# Patient Record
Sex: MALE | Race: WHITE | NOT HISPANIC OR LATINO | Employment: FULL TIME | URBAN - NONMETROPOLITAN AREA
[De-identification: names, ages, dates, MRNs, and addresses within clinical notes are randomized per-mention and may not be internally consistent; named-entity substitution may affect disease eponyms.]

---

## 2017-01-25 ENCOUNTER — APPOINTMENT (OUTPATIENT)
Dept: OCCUPATIONAL MEDICINE | Facility: OTHER | Age: 32
End: 2017-01-25

## 2017-01-30 ENCOUNTER — APPOINTMENT (OUTPATIENT)
Dept: OCCUPATIONAL MEDICINE | Facility: OTHER | Age: 32
End: 2017-01-30

## 2021-08-11 ENCOUNTER — OFFICE VISIT (OUTPATIENT)
Dept: FAMILY MEDICINE | Facility: OTHER | Age: 36
End: 2021-08-11
Attending: NURSE PRACTITIONER
Payer: COMMERCIAL

## 2021-08-11 VITALS
HEART RATE: 74 BPM | SYSTOLIC BLOOD PRESSURE: 134 MMHG | HEIGHT: 71 IN | TEMPERATURE: 97.7 F | BODY MASS INDEX: 34.06 KG/M2 | WEIGHT: 243.25 LBS | DIASTOLIC BLOOD PRESSURE: 76 MMHG | OXYGEN SATURATION: 97 % | RESPIRATION RATE: 20 BRPM

## 2021-08-11 DIAGNOSIS — L53.9 ERYTHEMA OF FINGER: Primary | ICD-10-CM

## 2021-08-11 PROCEDURE — 99202 OFFICE O/P NEW SF 15 MIN: CPT | Performed by: NURSE PRACTITIONER

## 2021-08-11 RX ORDER — OMEPRAZOLE 40 MG/1
CAPSULE, DELAYED RELEASE ORAL
COMMUNITY
Start: 2021-08-09

## 2021-08-11 RX ORDER — FLUTICASONE PROPIONATE 50 MCG
SPRAY, SUSPENSION (ML) NASAL
COMMUNITY
Start: 2020-12-28

## 2021-08-11 RX ORDER — FAMOTIDINE 40 MG/1
TABLET, FILM COATED ORAL
COMMUNITY
Start: 2021-08-09

## 2021-08-11 RX ORDER — METHIMAZOLE 10 MG/1
10 TABLET ORAL
COMMUNITY
Start: 2020-11-18

## 2021-08-11 RX ORDER — MONTELUKAST SODIUM 10 MG/1
10 TABLET ORAL
COMMUNITY
Start: 2021-06-13

## 2021-08-11 RX ORDER — METOPROLOL SUCCINATE 25 MG/1
25 TABLET, EXTENDED RELEASE ORAL
COMMUNITY
Start: 2021-03-29

## 2021-08-11 RX ORDER — SILDENAFIL CITRATE 20 MG/1
20 TABLET ORAL
COMMUNITY
Start: 2021-07-19

## 2021-08-11 ASSESSMENT — PAIN SCALES - GENERAL: PAINLEVEL: MODERATE PAIN (4)

## 2021-08-11 ASSESSMENT — MIFFLIN-ST. JEOR: SCORE: 2055.5

## 2021-08-11 NOTE — PATIENT INSTRUCTIONS
Please start applying neosporin twice daily and cover with a bandage.     Soak the hand in warm water and epsom salt 2-3 times daily 10-15 mintues.

## 2021-08-11 NOTE — NURSING NOTE
Patient presents to clinic experiencing pain, redness, swelling to tip of ring finger on left hand for past week.  He states he cut a hang-nail last week and now is having pain to area.  Medication Reconciliation: complete    Gabby Dey LPN

## 2021-08-11 NOTE — PROGRESS NOTES
ASSESSMENT/PLAN:  1. Erythema of finger      Discussed with the patient that based on his symptoms and physical exam findings this localized area of swelling and erythema should be treated with a topical OTC antibiotic ointment. An oral antibiotic is not indicated at this time. Discussed with the patient to also Soak the hand in warm water and epsom salt 2-3 times daily 10-15 mintues.     Instructed the patient to follow up with his PCP if his symptoms persist or worsen.     May use over-the-counter Tylenol or ibuprofen PRN    Discussed warning signs/symptoms indicative of need to f/u    Follow up if symptoms persist or worsen or concerns      I explained my diagnostic considerations and recommendations to the patient, who voiced understanding and agreement with the treatment plan. All questions were answered. We discussed potential side effects of any prescribed or recommended therapies, as well as expectations for response to treatments.        HPI:    Ge Concepcion is a 36 year old male  who presents to [facility] today for left 4th finger erythema surrounding the lateral nail. The patient states that he cut a hang nail last week from the same location and today started noticing erythema started a couple of days ago and swelling started today surrounding the skin. Rating his pain 4-5/10. Very painful with palpation. Denies seeing any drainage from the affected area. Denies fevers. Denies any injury to the finger.     Past Medical History:   Diagnosis Date     Thyrotoxicosis     No Comments Provided     Past Surgical History:   Procedure Laterality Date     TONSILLECTOMY      No Comments Provided     Social History     Tobacco Use     Smoking status: Former Smoker     Years: 12.00     Types: Cigarettes     Smokeless tobacco: Never Used   Substance Use Topics     Alcohol use: Yes     Current Outpatient Medications   Medication Sig Dispense Refill     famotidine (PEPCID) 40 MG tablet        fluticasone (FLONASE)  "50 MCG/ACT nasal spray INSTILL 1 SPRAY NASALLY TWICE DAILY. SHAKE GENTLY BEFORE EACH USE; ALTERNATE NOSTRILS WITH EACH SPRAY       methimazole (TAPAZOLE) 10 MG tablet Take 10 mg by mouth       metoprolol succinate ER (TOPROL-XL) 25 MG 24 hr tablet Take 25 mg by mouth       montelukast (SINGULAIR) 10 MG tablet Take 10 mg by mouth       omeprazole (PRILOSEC) 40 MG DR capsule        sildenafil (REVATIO) 20 MG tablet Take 20 mg by mouth       Allergies   Allergen Reactions     Erythromycin Nausea and Vomiting     Other reaction(s): Vomiting     Other Environmental Allergy      Other reaction(s): Chest Tightness, Eye Irritation, Headache, Rhinitis  Dogs, cats, grasses, pollens     Penicillins Other (See Comments) and Nausea and Vomiting         Past medical history, past surgical history, current medications and allergies reviewed and accurate to the best of my knowledge.        ROS:  Refer to HPI    /76 (BP Location: Left arm, Patient Position: Sitting, Cuff Size: Adult Large)   Pulse 74   Temp 97.7  F (36.5  C) (Tympanic)   Resp 20   Ht 1.803 m (5' 11\")   Wt 110.3 kg (243 lb 4 oz)   SpO2 97%   BMI 33.93 kg/m      EXAM:  General Appearance: Well appearing male, appropriate appearance for age. No acute distress  Respiratory: normal chest wall and respirations.  Normal effort.  Clear to auscultation bilaterally, no wheezing, crackles or rhonchi.  No increased work of breathing.  No cough appreciated.  Cardiac: RRR with no murmurs  Musculoskeletal:  Equal movement of bilateral upper extremities.  Equal movement of bilateral lower extremities.  Normal gait.    Dermatological: Mild erythema and swelling to the lateral aspect of the left 4th finger nail.  Psychological: normal affect, alert, oriented, and pleasant.               "

## 2021-12-21 ENCOUNTER — HISTORICAL (OUTPATIENT)
Dept: ADMINISTRATIVE | Facility: HOSPITAL | Age: 36
End: 2021-12-21

## 2021-12-23 LAB — FINAL CULTURE: NO GROWTH

## 2023-03-20 ENCOUNTER — OFFICE VISIT (OUTPATIENT)
Dept: FAMILY MEDICINE | Facility: OTHER | Age: 38
End: 2023-03-20
Attending: NURSE PRACTITIONER
Payer: COMMERCIAL

## 2023-03-20 ENCOUNTER — HOSPITAL ENCOUNTER (OUTPATIENT)
Dept: GENERAL RADIOLOGY | Facility: OTHER | Age: 38
Discharge: HOME | End: 2023-03-20
Attending: NURSE PRACTITIONER
Payer: COMMERCIAL

## 2023-03-20 VITALS
HEART RATE: 87 BPM | TEMPERATURE: 98.3 F | OXYGEN SATURATION: 98 % | DIASTOLIC BLOOD PRESSURE: 86 MMHG | RESPIRATION RATE: 18 BRPM | BODY MASS INDEX: 40.43 KG/M2 | SYSTOLIC BLOOD PRESSURE: 130 MMHG | WEIGHT: 289.9 LBS

## 2023-03-20 DIAGNOSIS — M47.817 FACET HYPERTROPHY OF LUMBOSACRAL REGION: Primary | ICD-10-CM

## 2023-03-20 DIAGNOSIS — M54.50 ACUTE MIDLINE LOW BACK PAIN WITHOUT SCIATICA: ICD-10-CM

## 2023-03-20 PROCEDURE — 72100 X-RAY EXAM L-S SPINE 2/3 VWS: CPT

## 2023-03-20 PROCEDURE — 96372 THER/PROPH/DIAG INJ SC/IM: CPT | Performed by: NURSE PRACTITIONER

## 2023-03-20 PROCEDURE — 99203 OFFICE O/P NEW LOW 30 MIN: CPT | Performed by: NURSE PRACTITIONER

## 2023-03-20 PROCEDURE — 250N000011 HC RX IP 250 OP 636: Performed by: NURSE PRACTITIONER

## 2023-03-20 RX ORDER — METHOCARBAMOL 750 MG/1
750 TABLET, FILM COATED ORAL 3 TIMES DAILY PRN
Qty: 30 TABLET | Refills: 1 | Status: SHIPPED | OUTPATIENT
Start: 2023-03-20

## 2023-03-20 RX ORDER — KETOROLAC TROMETHAMINE 30 MG/ML
30 INJECTION, SOLUTION INTRAMUSCULAR; INTRAVENOUS ONCE
Status: COMPLETED | OUTPATIENT
Start: 2023-03-20 | End: 2023-03-20

## 2023-03-20 RX ORDER — NAPROXEN 500 MG/1
500 TABLET ORAL 2 TIMES DAILY WITH MEALS
Qty: 14 TABLET | Refills: 0 | Status: SHIPPED | OUTPATIENT
Start: 2023-03-20

## 2023-03-20 RX ADMIN — KETOROLAC TROMETHAMINE 30 MG: 30 INJECTION, SOLUTION INTRAMUSCULAR; INTRAVENOUS at 12:53

## 2023-03-20 ASSESSMENT — PAIN SCALES - GENERAL: PAINLEVEL: SEVERE PAIN (7)

## 2023-03-20 NOTE — PROGRESS NOTES
ASSESSMENT/PLAN:     I have reviewed the nursing notes.  I have reviewed the findings, diagnosis, plan and need for follow up with the patient.      1. Acute midline low back pain without sciatica    - ketorolac (TORADOL) injection 30 mg IM injection administered in clinic with minimal decrease in pain  - XR Lumbar Spine 2/3 Views  - naproxen (NAPROSYN) 500 MG tablet; Take 1 tablet (500 mg) by mouth 2 times daily (with meals)  Dispense: 14 tablet; Refill: 0  - methocarbamol (ROBAXIN) 750 MG tablet; Take 1 tablet (750 mg) by mouth 3 times daily as needed for muscle spasms  Dispense: 30 tablet; Refill: 1    PDMP reviewed, no concerns    Xray of lumbar spine completed and personally reviewed, no obvious abnormality noted, radiologist over read:  No acute fracture. Preserved lumbar heights and alignment. Mild facet hypertrophy best seen at L5-S1.        2. Facet hypertrophy of lumbosacral region    - naproxen (NAPROSYN) 500 MG tablet; Take 1 tablet (500 mg) by mouth 2 times daily (with meals)  Dispense: 14 tablet; Refill: 0  - methocarbamol (ROBAXIN) 750 MG tablet; Take 1 tablet (750 mg) by mouth 3 times daily as needed for muscle spasms  Dispense: 30 tablet; Refill: 1    Discussed xray results with patient.    Encouraged low impact activities such as walking and swimming on regular basis.  Staying active is important.  May use back brace occasionally for postural support.  Encouraged correct posture.  Encouraged exercise to strengthen abdominal and back muscles  Encouraged alternating ice and heat TID PRN  Encouraged OTC pain cream or patches PRN  Start Aleve tomorrow as Toradol injection was administered today in clinic  Start muscle relaxer today for right paraspinal muscle tenderness    Discussed warning signs/symptoms indicative of need to f/u including bowel or bladder dysfunction or incontinence, lower extremity weakness or severe numbness/tingling, difficulty ambulating, or any concerns, etc   Follow up if  symptoms persist or worsen or concerns      I explained my diagnostic considerations and recommendations to the patient, who voiced understanding and agreement with the treatment plan. All questions were answered. We discussed potential side effects of any prescribed or recommended therapies, as well as expectations for response to treatments.    Naheed Kee NP  [facility]      SUBJECTIVE:   Ge Concepcion is a 37 year old male who presents to clinic today for the following health issues:  Lower back pain    HPI  States midline lower back tightness for the past 4 days and now painful since yesterday.  No known injury or trauma.   Yesterday only able to lay on side to relieve the pain.  Increased pain with lifting, walking and bending.  No numbness, tingling or weakness in lower extremities.   No fevers or chills.   No difficulty with bowel or bladder functions.  No dysuria or hematuria.  Pushing fluids.  Taking Tylenol and Ibuprofen - last Tylenol about 2 hours ago and Ibuprofen last about 4 hours ago with minimal decrease in pain.  No ice.  Occasional heat.      Past Medical History:   Diagnosis Date     Thyrotoxicosis     No Comments Provided     Past Surgical History:   Procedure Laterality Date     TONSILLECTOMY      No Comments Provided     Social History     Tobacco Use     Smoking status: Former     Years: 12.00     Types: Cigarettes     Smokeless tobacco: Never   Substance Use Topics     Alcohol use: Yes     Current Outpatient Medications   Medication Sig Dispense Refill     famotidine (PEPCID) 40 MG tablet        fluticasone (FLONASE) 50 MCG/ACT nasal spray INSTILL 1 SPRAY NASALLY TWICE DAILY. SHAKE GENTLY BEFORE EACH USE; ALTERNATE NOSTRILS WITH EACH SPRAY       methimazole (TAPAZOLE) 10 MG tablet Take 10 mg by mouth       metoprolol succinate ER (TOPROL-XL) 25 MG 24 hr tablet Take 25 mg by mouth       montelukast (SINGULAIR) 10 MG tablet Take 10 mg by mouth       omeprazole  (PRILOSEC) 40 MG DR capsule        sildenafil (REVATIO) 20 MG tablet Take 20 mg by mouth       Allergies   Allergen Reactions     Erythromycin Nausea and Vomiting     Other reaction(s): Vomiting     Other Environmental Allergy      Other reaction(s): Chest Tightness, Eye Irritation, Headache, Rhinitis  Dogs, cats, grasses, pollens     Penicillins Other (See Comments) and Nausea and Vomiting         Past medical history, past surgical history, current medications and allergies reviewed and accurate to the best of my knowledge.        OBJECTIVE:     /86 (BP Location: Right arm, Patient Position: Sitting, Cuff Size: Adult Large)   Pulse 87   Temp 98.3  F (36.8  C) (Tympanic)   Resp 18   Wt 131.5 kg (289 lb 14.4 oz)   SpO2 98%   BMI 40.43 kg/m    Body mass index is 40.43 kg/m .     Physical Exam  General Appearance: Well appearing adult male, appropriate appearance for age. No acute distress  Respiratory: normal chest wall and respirations.  Normal effort.  No cough appreciated.  Cardiac: RRR with no murmurs  Musculoskeletal:  Equal movement of bilateral upper extremities.  Equal movement of bilateral lower extremities.  Lumbar spine with tenderness to palpation.  Right lumbar paraspinal tenderness.  Non tender left lumbar paraspinal.  Changes position with guarding and from sitting to standing with using hands to push off.  Normal and equal bilateral straight leg raises without difficulty and denies discomfort.  Equal bilateral lateral hip motion with discomfort noted.  Able to bend at waist to fingers to mid shins.  Normal gait.    Dermatological:   Psychological: normal affect, alert, oriented, and pleasant.       Imaging:  Results for orders placed or performed in visit on 03/20/23   XR Lumbar Spine 2/3 Views     Status: None    Narrative    XR LUMBAR SPINE 2/3 VIEWS    HISTORY: Acute midline low back pain without sciatica .    COMPARISON: None.    TECHNIQUE: 3 views of the lumbosacral  spine.    FINDINGS:    No acute fracture. Preserved lumbar heights and alignment. Mild facet  hypertrophy best seen at L5-S1.        Impression    IMPRESSION:     Mild L5-S1 facet hypertrophy.      ESTELLE PIERCE MD         SYSTEM ID:  [id number]

## 2023-03-20 NOTE — NURSING NOTE
Pt states that back has been tight for the last 4-5 days.  Really started hurting yesterday.  Low back

## 2023-05-04 ENCOUNTER — HOSPITAL ENCOUNTER (EMERGENCY)
Facility: OTHER | Age: 38
Discharge: HOME | End: 2023-05-04
Attending: PHYSICIAN ASSISTANT | Admitting: PHYSICIAN ASSISTANT
Payer: COMMERCIAL

## 2023-05-04 VITALS
WEIGHT: 289 LBS | BODY MASS INDEX: 40.46 KG/M2 | SYSTOLIC BLOOD PRESSURE: 158 MMHG | HEART RATE: 86 BPM | OXYGEN SATURATION: 96 % | HEIGHT: 71 IN | RESPIRATION RATE: 20 BRPM | DIASTOLIC BLOOD PRESSURE: 100 MMHG | TEMPERATURE: 98.8 F

## 2023-05-04 DIAGNOSIS — M54.50 LOW BACK PAIN, UNSPECIFIED BACK PAIN LATERALITY, UNSPECIFIED CHRONICITY, UNSPECIFIED WHETHER SCIATICA PRESENT: ICD-10-CM

## 2023-05-04 PROCEDURE — 99285 EMERGENCY DEPT VISIT HI MDM: CPT | Performed by: PHYSICIAN ASSISTANT

## 2023-05-04 PROCEDURE — 99283 EMERGENCY DEPT VISIT LOW MDM: CPT | Performed by: PHYSICIAN ASSISTANT

## 2023-05-04 PROCEDURE — 250N000011 HC RX IP 250 OP 636: Performed by: PHYSICIAN ASSISTANT

## 2023-05-04 PROCEDURE — 250N000012 HC RX MED GY IP 250 OP 636 PS 637: Performed by: PHYSICIAN ASSISTANT

## 2023-05-04 PROCEDURE — 250N000013 HC RX MED GY IP 250 OP 250 PS 637: Performed by: PHYSICIAN ASSISTANT

## 2023-05-04 PROCEDURE — 96372 THER/PROPH/DIAG INJ SC/IM: CPT | Performed by: PHYSICIAN ASSISTANT

## 2023-05-04 RX ORDER — DIAZEPAM 10 MG/2ML
5 INJECTION, SOLUTION INTRAMUSCULAR; INTRAVENOUS ONCE
Status: DISCONTINUED | OUTPATIENT
Start: 2023-05-04 | End: 2023-05-04

## 2023-05-04 RX ORDER — LORAZEPAM 2 MG/ML
1 INJECTION INTRAMUSCULAR ONCE
Status: DISCONTINUED | OUTPATIENT
Start: 2023-05-04 | End: 2023-05-04

## 2023-05-04 RX ORDER — METHOCARBAMOL 500 MG/1
1000 TABLET, FILM COATED ORAL 4 TIMES DAILY
Status: DISCONTINUED | OUTPATIENT
Start: 2023-05-04 | End: 2023-05-04 | Stop reason: HOSPADM

## 2023-05-04 RX ORDER — KETOROLAC TROMETHAMINE 15 MG/ML
15 INJECTION, SOLUTION INTRAMUSCULAR; INTRAVENOUS ONCE
Status: COMPLETED | OUTPATIENT
Start: 2023-05-04 | End: 2023-05-04

## 2023-05-04 RX ORDER — LORAZEPAM 2 MG/ML
1 INJECTION INTRAMUSCULAR ONCE
Status: COMPLETED | OUTPATIENT
Start: 2023-05-04 | End: 2023-05-04

## 2023-05-04 RX ORDER — OXYCODONE AND ACETAMINOPHEN 5; 325 MG/1; MG/1
2 TABLET ORAL ONCE
Status: COMPLETED | OUTPATIENT
Start: 2023-05-04 | End: 2023-05-04

## 2023-05-04 RX ORDER — PREDNISONE 20 MG/1
40 TABLET ORAL ONCE
Status: COMPLETED | OUTPATIENT
Start: 2023-05-04 | End: 2023-05-04

## 2023-05-04 RX ORDER — METHOCARBAMOL 1000 MG/1
1000 TABLET, FILM COATED ORAL 3 TIMES DAILY
Qty: 15 TABLET | Refills: 0 | Status: SHIPPED | OUTPATIENT
Start: 2023-05-04 | End: 2023-05-09

## 2023-05-04 RX ORDER — TRAMADOL HYDROCHLORIDE 50 MG/1
50 TABLET ORAL EVERY 6 HOURS PRN
Qty: 6 TABLET | Refills: 0 | Status: SHIPPED | OUTPATIENT
Start: 2023-05-04

## 2023-05-04 RX ORDER — PREDNISONE 20 MG/1
TABLET ORAL
Qty: 10 TABLET | Refills: 0 | Status: SHIPPED | OUTPATIENT
Start: 2023-05-04

## 2023-05-04 RX ORDER — NAPROXEN SODIUM 550 MG
550 TABLET ORAL 2 TIMES DAILY WITH MEALS
Qty: 10 TABLET | Refills: 0 | Status: SHIPPED | OUTPATIENT
Start: 2023-05-04 | End: 2023-05-09

## 2023-05-04 RX ADMIN — OXYCODONE HYDROCHLORIDE AND ACETAMINOPHEN 2 TABLET: 5; 325 TABLET ORAL at 17:12

## 2023-05-04 RX ADMIN — PREDNISONE 40 MG: 20 TABLET ORAL at 18:26

## 2023-05-04 RX ADMIN — METHOCARBAMOL 1000 MG: 500 TABLET ORAL at 16:54

## 2023-05-04 RX ADMIN — LORAZEPAM 1 MG: 2 INJECTION INTRAMUSCULAR; INTRAVENOUS at 18:32

## 2023-05-04 ASSESSMENT — ACTIVITIES OF DAILY LIVING (ADL)
ADLS_ACUITY_SCORE: 35
ADLS_ACUITY_SCORE: 35

## 2023-05-04 NOTE — ED NOTES
Patient continues to report significant pain when sitting up and standing, but is doing better with movement.  He appears less guarded.  Ice packs, he reports, are very helpful.

## 2023-05-04 NOTE — ED TRIAGE NOTES
Patient was in his truck when he went to get out of his truck at around 3pm and he had a sudden onset of back pain.  No trauma or injury prior to this.  He was at [facility] about a month ago for back pain and was told he has arthritis in his back.  He arrives via [facility].  Unable to sit in w/c for triage, brought to a room due to severe pain.  Denies any numbness or tingling in extremities or loss of bowel/bladder.  Pain is to mid-low back, describes it as warm, aching, and shooting. Was unable to lift feet to put in w/c foot pedals without causing worsening pain.  Difficulty transferring from w/c to bed.  Ice provided to patient for pain.

## 2023-05-04 NOTE — ED NOTES
Patient resting in bed.  Significant other at bedside.  Pain medication given.  Holding IM toradol, as patient had this before and it was painful.  If pain does not decrease with other medications, he will take it however.

## 2023-05-04 NOTE — ED PROVIDER NOTES
Chief Complaint:  Back Pain       HPI   Ge Concepcion is a 38 year old male who presents with significant other for lower back pain when he got a truck around 3 PM he had sudden onset of back pain at 40 mg of about a month ago is seen in the [facility] care for similar incident where he hurt his back and was told he has arthritis in his back and comes in by ambulance for further evaluation no urinary retention no loss bowel bladder function did not fall and hit his head he does not have any headache neck pain chest pain shortness of breath no abdominal pain rashes he does not have any trauma he presents today for further evaluation of the above symptoms.  His symptoms are moderate to severe worse with movement.  He says the only thing at this time that helps with his ice.    Independent Historian: Patient provides the history    Review of External Notes: reviewed    ROS:  Please see HPI for pertinent positives and negatives.  All other systems reviewed and found to be negative.     Allergies:  Erythromycin  Other Environmental Allergy  Penicillins     Medications:    ANAPROX  MG tablet  famotidine (PEPCID) 40 MG tablet  fluticasone (FLONASE) 50 MCG/ACT nasal spray  methimazole (TAPAZOLE) 10 MG tablet  methocarbamol (ROBAXIN) 750 MG tablet  methocarbamol 1000 MG TABS  metoprolol succinate ER (TOPROL-XL) 25 MG 24 hr tablet  montelukast (SINGULAIR) 10 MG tablet  naproxen (NAPROSYN) 500 MG tablet  omeprazole (PRILOSEC) 40 MG DR capsule  predniSONE (DELTASONE) 20 MG tablet  traMADol (ULTRAM) 50 MG tablet  sildenafil (REVATIO) 20 MG tablet        Past Medical History:    Past Medical History:   Diagnosis Date     Thyrotoxicosis        Past Surgical History:    Past Surgical History:   Procedure Laterality Date     TONSILLECTOMY      No Comments Provided        Family History:    family history is not on file.    Social History:   reports that he has quit smoking. His smoking use included cigarettes. He has never used  "smokeless tobacco. He reports current alcohol use.  PCP: No Ref-Primary, Physician     Physical Exam     Patient Vitals for the past 24 hrs:   BP Temp Temp src Pulse Resp SpO2 Height Weight   05/04/23 1623 (!) 157/81 98.8  F (37.1  C) Tympanic 73 18 98 % 1.803 m (5' 11\") 131.1 kg (289 lb)        Vitals:    05/04/23 1623   BP: (!) 157/81   Pulse: 73   Resp: 18   Temp: 98.8  F (37.1  C)   TempSrc: Tympanic   SpO2: 98%   Weight: 131.1 kg (289 lb)   Height: 1.803 m (5' 11\")       Physical Exam  Exam:  Constitutional: healthy, alert and no distress  Head: Normocephalic.    Neck: Neck supple.    ENT: ENT exam normal, no neck nodes or sinus tenderness  Cardiovascular:  RRR. No murmurs, clicks gallops or rub  Respiratory:  Lungs clear  Gastrointestinal: Abdomen soft, non-tender. BS normal. No masses, organomegaly  : Deferred  Musculoskeletal: extremities normal- no gross deformities noted, gait normal and normal muscle tone straight leg raise is negative but does have some mild discomfort strength is 5 out of 5 sensation and motor intact DTRs are intact he can go from a sitting position to a standing position with mild discomfort.  He was able to transition to the bed from a lying position to rolling over.  Skin: no suspicious lesions or rashes  Neurologic: Gait normal. Reflexes normal and symmetric. Sensation grossly WNL.  Psychiatric: mentation appears normal and affect normal/bright         Emergency Department Course           Laboratory:  Labs Ordered and Resulted from Time of ED Arrival to Time of ED Departure - No data to display     Emergency Department Course & Assessments:             Interventions:  Medications   methocarbamol (ROBAXIN) tablet 1,000 mg (1,000 mg Oral $Given 5/4/23 1654)   oxyCODONE-acetaminophen (PERCOCET) 5-325 MG per tablet 2 tablet (2 tablets Oral $Given 5/4/23 1712)   ketorolac (TORADOL) injection 15 mg (15 mg Intravenous Not Given 5/4/23 1713)   predniSONE (DELTASONE) tablet 40 mg (40 mg " Oral $Given 5/4/23 1826)   LORazepam (ATIVAN) injection 1 mg (1 mg Intramuscular $Given 5/4/23 1832)        Orders Place This Encounter:  Orders Placed This Encounter   Procedures     Physical Therapy Referral       Independent Interpretation (X-rays, CTs, rhythm strip):  None    Consultations/Discussion of Management or Tests:  None     Social Determinants of Health affecting care:    Social Determinants of Health     Tobacco Use: Medium Risk (5/4/2023)    Patient History      Smoking Tobacco Use: Former      Smokeless Tobacco Use: Never      Passive Exposure: Not on file   Alcohol Use: Not on file   Financial Resource Strain: Not on file   Food Insecurity: Not on file   Transportation Needs: Not on file   Physical Activity: Not on file   Stress: Not on file   Social Connections: Not on file   Intimate Partner Violence: Not on file   Depression: Not at risk (3/20/2023)    PHQ-2      PHQ-2 Score: 0   Housing Stability: Not on file       At this time the patient does not have any concerns for food security, transportation, healthcare access and the patient currently lives at home.     Disposition:  The patient was discharged to home.     Impression & Plan    CMS Diagnoses:       Medical Decision Making:    RN & Ancillary Notes:    I have reviewed nursing & ancillary notes, and agree with protocol as initiated.      Pleasant gentleman who presents emerged part for evaluation of acute exacerbation of back pain that he hurt himself about a month ago for her to begin today his pain is moderate to severe in nature.  He did receive the above medications which seemed to help but he received the below once for at home.  He will continue to ice expected course discussed I placed an order for physical therapy patient is up ambulatory and strength is excellent he is amatory with a steady gait he will be discharged home.    Diagnosis:    ICD-10-CM    1. Low back pain, unspecified back pain laterality, unspecified chronicity,  unspecified whether sciatica present  M54.50 Physical Therapy Referral           Discharge Medications:  New Prescriptions    ANAPROX  MG TABLET    Take 1 tablet (550 mg) by mouth 2 times daily (with meals) for 10 doses    METHOCARBAMOL 1000 MG TABS    Take 1,000 mg by mouth 3 times daily for 15 doses    PREDNISONE (DELTASONE) 20 MG TABLET    Take two tablets (= 40mg) each day for 5 (five) days    TRAMADOL (ULTRAM) 50 MG TABLET    Take 1 tablet (50 mg) by mouth every 6 hours as needed for severe pain        5/4/2023   Viet Jade, KARLENE  MPAS, CAQ-EM        Viet Jade PA-C  05/04/23 1900

## 2023-05-22 ENCOUNTER — HOSPITAL ENCOUNTER (OUTPATIENT)
Dept: MRI IMAGING | Facility: OTHER | Age: 38
Discharge: HOME | End: 2023-05-22
Attending: NURSE PRACTITIONER | Admitting: NURSE PRACTITIONER
Payer: COMMERCIAL

## 2023-05-22 DIAGNOSIS — G89.29 CHRONIC MIDLINE LOW BACK PAIN WITHOUT SCIATICA: ICD-10-CM

## 2023-05-22 DIAGNOSIS — G89.29 CHRONIC PAIN: ICD-10-CM

## 2023-05-22 DIAGNOSIS — M54.50 CHRONIC MIDLINE LOW BACK PAIN WITHOUT SCIATICA: ICD-10-CM

## 2023-05-22 PROCEDURE — 72148 MRI LUMBAR SPINE W/O DYE: CPT

## 2023-07-29 ENCOUNTER — HEALTH MAINTENANCE LETTER (OUTPATIENT)
Age: 38
End: 2023-07-29

## 2024-03-07 ENCOUNTER — OFFICE VISIT (OUTPATIENT)
Dept: FAMILY MEDICINE | Facility: OTHER | Age: 39
End: 2024-03-07
Attending: NURSE PRACTITIONER
Payer: COMMERCIAL

## 2024-03-07 VITALS
RESPIRATION RATE: 20 BRPM | BODY MASS INDEX: 36.64 KG/M2 | DIASTOLIC BLOOD PRESSURE: 88 MMHG | TEMPERATURE: 98.2 F | OXYGEN SATURATION: 97 % | SYSTOLIC BLOOD PRESSURE: 136 MMHG | HEIGHT: 71 IN | WEIGHT: 261.7 LBS | HEART RATE: 81 BPM

## 2024-03-07 DIAGNOSIS — K04.7 DENTAL INFECTION: Primary | ICD-10-CM

## 2024-03-07 PROCEDURE — 99213 OFFICE O/P EST LOW 20 MIN: CPT

## 2024-03-07 RX ORDER — CEFDINIR 300 MG/1
300 CAPSULE ORAL 2 TIMES DAILY
Qty: 20 CAPSULE | Refills: 0 | Status: SHIPPED | OUTPATIENT
Start: 2024-03-07 | End: 2024-03-17

## 2024-03-07 RX ORDER — PREDNISONE 20 MG/1
40 TABLET ORAL DAILY
Qty: 6 TABLET | Refills: 0 | Status: SHIPPED | OUTPATIENT
Start: 2024-03-07 | End: 2024-03-10

## 2024-03-07 ASSESSMENT — PAIN SCALES - GENERAL: PAINLEVEL: SEVERE PAIN (7)

## 2024-03-07 NOTE — PROGRESS NOTES
ASSESSMENT/PLAN:    (K04.7) Dental infection  (primary encounter diagnosis)  Comment: Patient has had ongoing dental issues and is concerned that he has a dental infection to the top left molar, no drainage or fevers and chills, but some mild swelling.  He sees the dentist on 4/5/2024.  On exam Left gumline with erythema and mild swelling, impaired dentition appreciated.  Allergy to penicillins and erythromycin.  Will opt to start treating with cefdinir at this time.  Patient reports that last time he had a dental infection he was also given prednisone for his symptoms which helped significantly, short burst was sent.  Plan: cefdinir (OMNICEF) 300 MG capsule, predniSONE         (DELTASONE) 20 MG tablet  Take antibiotic as ordered. Take with food. Daily probiotic while on this medication.  You may use prednisone, daily with food. Take earlier in the day to avoid side effects at bedtime.    Follow up with your dentist as soon as possible. F/U sooner if symptoms worsen or you have any concerns.     Discussed warning signs/symptoms indicative of need to f/u    Follow up if symptoms persist or worsen or concerns    I have reviewed the nursing notes.  I have reviewed the findings, diagnosis, plan and need for follow up with the patient.    I explained my diagnostic considerations and recommendations to the patient, who voiced understanding and agreement with the treatment plan. All questions were answered. We discussed potential side effects of any prescribed or recommended therapies, as well as expectations for response to treatments.    LUPE WEISS, SERA CNP  3/7/2024  12:48 PM    HPI:    Ge Concepcion is a 38 year old male  who presents [facility] today for concerns of dental infection.    Top left molar with concern of dental infection.  No drainage to site. No fevers or chills. Mild swelling. Patient states that dentist in [facility] but his next appointment is not until 4/5/2024.  He is taking Aleve  and Tylenol for the pain.    Allergy to penicillins and erythromycin.    Past Medical History:   Diagnosis Date    Thyrotoxicosis     No Comments Provided     Past Surgical History:   Procedure Laterality Date    TONSILLECTOMY      No Comments Provided     Social History     Tobacco Use    Smoking status: Former     Years: 12     Types: Cigarettes    Smokeless tobacco: Never   Substance Use Topics    Alcohol use: Yes     Comment: 375 clear spirits a day     Current Outpatient Medications   Medication Sig Dispense Refill    methimazole (TAPAZOLE) 10 MG tablet Take 10 mg by mouth      naproxen (NAPROSYN) 500 MG tablet Take 1 tablet (500 mg) by mouth 2 times daily (with meals) 14 tablet 0    traMADol (ULTRAM) 50 MG tablet Take 1 tablet (50 mg) by mouth every 6 hours as needed for severe pain 6 tablet 0    famotidine (PEPCID) 40 MG tablet  (Patient not taking: Reported on 3/7/2024)      fluticasone (FLONASE) 50 MCG/ACT nasal spray INSTILL 1 SPRAY NASALLY TWICE DAILY. SHAKE GENTLY BEFORE EACH USE; ALTERNATE NOSTRILS WITH EACH SPRAY (Patient not taking: Reported on 3/7/2024)      methocarbamol (ROBAXIN) 750 MG tablet Take 1 tablet (750 mg) by mouth 3 times daily as needed for muscle spasms (Patient not taking: Reported on 3/7/2024) 30 tablet 1    metoprolol succinate ER (TOPROL-XL) 25 MG 24 hr tablet Take 25 mg by mouth (Patient not taking: Reported on 3/7/2024)      montelukast (SINGULAIR) 10 MG tablet Take 10 mg by mouth (Patient not taking: Reported on 3/7/2024)      omeprazole (PRILOSEC) 40 MG DR capsule  (Patient not taking: Reported on 3/7/2024)      predniSONE (DELTASONE) 20 MG tablet Take two tablets (= 40mg) each day for 5 (five) days (Patient not taking: Reported on 3/7/2024) 10 tablet 0    sildenafil (REVATIO) 20 MG tablet Take 20 mg by mouth (Patient not taking: Reported on 3/7/2024)       Allergies   Allergen Reactions    Erythromycin Nausea and Vomiting     Other reaction(s): Vomiting    Other Environmental  "Allergy      Other reaction(s): Chest Tightness, Eye Irritation, Headache, Rhinitis  Dogs, cats, grasses, pollens    Penicillins Other (See Comments) and Nausea and Vomiting     Past medical history, past surgical history, current medications and allergies reviewed and accurate to the best of my knowledge.      ROS:  Refer to HPI    /88 (BP Location: Right arm, Patient Position: Sitting, Cuff Size: Adult Large)   Pulse 81   Temp 98.2  F (36.8  C) (Tympanic)   Resp 20   Ht 1.803 m (5' 11\")   Wt 118.7 kg (261 lb 11.2 oz)   SpO2 97%   BMI 36.50 kg/m      EXAM:  General Appearance: Well appearing 38 year old male, appropriate appearance for age. No acute distress   Ears: Left TM intact, translucent with bony landmarks appreciated, no erythema, no effusion, no bulging, no purulence.  Right TM intact, translucent with bony landmarks appreciated, no erythema, no effusion, no bulging, no purulence.  Left auditory canal clear.  Right auditory canal clear.  Normal external ears, non tender.  Eyes: conjunctivae normal without erythema or irritation, corneas clear, no drainage or crusting, no eyelid swelling, pupils equal   Oropharynx: moist mucous membranes, posterior pharynx with erythema, no exudates or petechiae, no post nasal drip seen, no trismus, voice clear.  Erythema to top gumline with mild swelling, impaired dentition noted.   Sinuses:  No sinus tenderness upon palpation of the frontal or maxillary sinuses  Nose:  Bilateral nares: no erythema, no edema, no drainage or congestion   Neck: supple without adenopathy  Respiratory: normal chest wall and respirations.  Normal effort.  Clear to auscultation bilaterally, no wheezing, crackles or rhonchi.  No increased work of breathing.  No cough appreciated.  Cardiac: RRR with no murmurs  Musculoskeletal:  Equal movement of bilateral upper extremities.  Equal movement of bilateral lower extremities.  Normal gait.    Dermatological: no rashes noted of exposed " skin  Neuro: Alert and oriented to person, place, and time.  Cranial nerves II-XII grossly intact with no focal or lateralizing deficits.  Muscle tone normal.  Gait normal. No tremor.   Psychological: normal affect, alert, oriented, and pleasant.

## 2024-03-07 NOTE — PATIENT INSTRUCTIONS
Take antibiotic as ordered. Take with food. Daily probiotic while on this medication.  You may use prednisone, daily with food. Take earlier in the day to avoid side effects at bedtime.    Follow up with your dentist as soon as possible. F/U sooner if symptoms worsen or you have any concerns.

## 2024-03-07 NOTE — NURSING NOTE
"Pt presents to  for dental pain in top L of mouth. Pt has gotten into dentist in [place], but isn't being seen until 4/5/24. Pt has concerns about infections.  Pt taking Aleve and Tylenol for pain - last dose Tylenol 0900.    Chief Complaint   Patient presents with    Dental Pain     Top L       FOOD SECURITY SCREENING QUESTIONS  Hunger Vital Signs:  Within the past 12 months we worried whether our food would run out before we got money to buy more. Never  Within the past 12 months the food we bought just didn't last and we didn't have money to get more. Never  Gabby Dealeoon 3/7/2024 12:42 PM      Initial /88 (BP Location: Right arm, Patient Position: Sitting, Cuff Size: Adult Large)   Pulse 81   Temp 98.2  F (36.8  C) (Tympanic)   Resp 20   Ht 1.803 m (5' 11\")   Wt 118.7 kg (261 lb 11.2 oz)   SpO2 97%   BMI 36.50 kg/m   Estimated body mass index is 36.5 kg/m  as calculated from the following:    Height as of this encounter: 1.803 m (5' 11\").    Weight as of this encounter: 118.7 kg (261 lb 11.2 oz).  Medication Reconciliation: complete    Gabby Hdez    "

## 2024-03-30 ENCOUNTER — HOSPITAL ENCOUNTER (EMERGENCY)
Facility: OTHER | Age: 39
Discharge: HOME | End: 2024-03-30
Attending: FAMILY MEDICINE | Admitting: FAMILY MEDICINE
Payer: COMMERCIAL

## 2024-03-30 VITALS
RESPIRATION RATE: 22 BRPM | TEMPERATURE: 98.4 F | BODY MASS INDEX: 36.4 KG/M2 | HEART RATE: 92 BPM | DIASTOLIC BLOOD PRESSURE: 121 MMHG | SYSTOLIC BLOOD PRESSURE: 159 MMHG | OXYGEN SATURATION: 96 % | HEIGHT: 71 IN | WEIGHT: 260 LBS

## 2024-03-30 DIAGNOSIS — K04.7 DENTAL INFECTION: ICD-10-CM

## 2024-03-30 PROCEDURE — 250N000009 HC RX 250: Performed by: FAMILY MEDICINE

## 2024-03-30 PROCEDURE — 250N000013 HC RX MED GY IP 250 OP 250 PS 637: Performed by: FAMILY MEDICINE

## 2024-03-30 PROCEDURE — 99284 EMERGENCY DEPT VISIT MOD MDM: CPT

## 2024-03-30 PROCEDURE — 99283 EMERGENCY DEPT VISIT LOW MDM: CPT | Performed by: FAMILY MEDICINE

## 2024-03-30 RX ORDER — HYDROCODONE BITARTRATE AND ACETAMINOPHEN 5; 325 MG/1; MG/1
1 TABLET ORAL ONCE
Status: COMPLETED | OUTPATIENT
Start: 2024-03-30 | End: 2024-03-30

## 2024-03-30 RX ORDER — CLINDAMYCIN HCL 150 MG
150 CAPSULE ORAL 3 TIMES DAILY
Qty: 30 CAPSULE | Refills: 0 | Status: SHIPPED | OUTPATIENT
Start: 2024-03-30 | End: 2024-04-09

## 2024-03-30 RX ORDER — HYDROCODONE BITARTRATE AND ACETAMINOPHEN 5; 325 MG/1; MG/1
1 TABLET ORAL EVERY 6 HOURS PRN
Qty: 6 TABLET | Refills: 0 | Status: SHIPPED | OUTPATIENT
Start: 2024-03-30

## 2024-03-30 RX ORDER — DEXAMETHASONE SODIUM PHOSPHATE 4 MG/ML
10 VIAL (ML) INJECTION ONCE
Status: COMPLETED | OUTPATIENT
Start: 2024-03-30 | End: 2024-03-30

## 2024-03-30 RX ADMIN — DEXAMETHASONE SODIUM PHOSPHATE 10 MG: 4 INJECTION, SOLUTION INTRA-ARTICULAR; INTRALESIONAL; INTRAMUSCULAR; INTRAVENOUS; SOFT TISSUE at 22:32

## 2024-03-30 RX ADMIN — HYDROCODONE BITARTRATE AND ACETAMINOPHEN 1 TABLET: 5; 325 TABLET ORAL at 22:32

## 2024-03-30 ASSESSMENT — COLUMBIA-SUICIDE SEVERITY RATING SCALE - C-SSRS
6. HAVE YOU EVER DONE ANYTHING, STARTED TO DO ANYTHING, OR PREPARED TO DO ANYTHING TO END YOUR LIFE?: NO
1. IN THE PAST MONTH, HAVE YOU WISHED YOU WERE DEAD OR WISHED YOU COULD GO TO SLEEP AND NOT WAKE UP?: NO
2. HAVE YOU ACTUALLY HAD ANY THOUGHTS OF KILLING YOURSELF IN THE PAST MONTH?: NO

## 2024-03-31 NOTE — ED NOTES
Chart opened due to received call from [facility] pharmacy wanting to confirm prescription and ELLIOTT number of provider. Rn able to provide confirmation, no further questions at this time.     Lisa MCBRIDE RN 3/31/2024 at 1033

## 2024-03-31 NOTE — ED TRIAGE NOTES
Patient is back for second time in 3 weeks for this pain.  Has an appointment in [place] next week, that was the soonest he can get in.  [facility] and [facility] but pain keeps getting worse.  Left upper side affected     Triage Assessment (Adult)       Row Name 03/30/24 8696          Triage Assessment    Airway WDL WDL        Respiratory WDL    Respiratory WDL WDL        Skin Circulation/Temperature WDL    Skin Circulation/Temperature WDL WDL        Cardiac WDL    Cardiac WDL WDL     Cardiac Rhythm NSR        Peripheral/Neurovascular WDL    Peripheral Neurovascular WDL WDL        Cognitive/Neuro/Behavioral WDL    Cognitive/Neuro/Behavioral WDL WDL        Mccammon Coma Scale    Best Eye Response 4-->(E4) spontaneous     Best Motor Response 6-->(M6) obeys commands     Best Verbal Response 5-->(V5) oriented     Mccammon Coma Scale Score 15

## 2024-03-31 NOTE — ED PROVIDER NOTES
History     Chief Complaint   Patient presents with    Dental Pain     The history is provided by the patient.     Ge Concepcion is a 38 year old male who has left upper jaw pain. He was on antibiotics recently for this but after they were done the pain is back. He has a dental appointment in a few weeks.     Allergies:  Allergies   Allergen Reactions    Erythromycin Nausea and Vomiting     Other reaction(s): Vomiting    Other Environmental Allergy      Other reaction(s): Chest Tightness, Eye Irritation, Headache, Rhinitis  Dogs, cats, grasses, pollens    Penicillins Other (See Comments) and Nausea and Vomiting       Problem List:    There are no problems to display for this patient.       Past Medical History:    Past Medical History:   Diagnosis Date    Thyrotoxicosis        Past Surgical History:    Past Surgical History:   Procedure Laterality Date    TONSILLECTOMY      No Comments Provided       Family History:    No family history on file.    Social History:  Marital Status:  Single [1]  Social History     Tobacco Use    Smoking status: Former     Years: 12     Types: Cigarettes    Smokeless tobacco: Never   Vaping Use    Vaping Use: Never used   Substance Use Topics    Alcohol use: Yes     Comment: 375 clear spirits a day    Drug use: Not Currently     Comment: THC edibles occassionally        Medications:    clindamycin (CLEOCIN) 150 MG capsule  famotidine (PEPCID) 40 MG tablet  fluticasone (FLONASE) 50 MCG/ACT nasal spray  HYDROcodone-acetaminophen (NORCO) 5-325 MG tablet  methimazole (TAPAZOLE) 10 MG tablet  methocarbamol (ROBAXIN) 750 MG tablet  metoprolol succinate ER (TOPROL-XL) 25 MG 24 hr tablet  montelukast (SINGULAIR) 10 MG tablet  naproxen (NAPROSYN) 500 MG tablet  omeprazole (PRILOSEC) 40 MG DR capsule  predniSONE (DELTASONE) 20 MG tablet  sildenafil (REVATIO) 20 MG tablet  traMADol (ULTRAM) 50 MG tablet          Review of Systems   HENT:  Positive for dental problem.    All other systems  "reviewed and are negative.      Physical Exam   BP: (!) 159/121  Pulse: 92  Temp: 98.4  F (36.9  C)  Resp: 22  Height: 180.3 cm (5' 11\")  Weight: 117.9 kg (260 lb)  SpO2: 96 %      Physical Exam  Vitals and nursing note reviewed.   Constitutional:       General: He is not in acute distress.     Appearance: Normal appearance.   HENT:      Mouth/Throat:      Comments: He has redness, no swelling, of the upper left gums. No abscess    Neurological:      Mental Status: He is alert.         Medications   dexAMETHasone (DECADRON) injectable solution used ORALLY 10 mg (10 mg Oral $Given 3/30/24 2232)   HYDROcodone-acetaminophen (NORCO) 5-325 MG per tablet 1 tablet (1 tablet Oral $Given 3/30/24 2232)       Assessments & Plan (with Medical Decision Making)  Ge Concepcion is a 38 year old male who has left upper jaw pain. He was on antibiotics recently for this but after they were done the pain is back. He has a dental appointment in a few weeks.   VS in the ED BP (!) 159/121   Pulse 92   Temp 98.4  F (36.9  C) (Tympanic)   Resp 22   Ht 1.803 m (5' 11\")   Wt 117.9 kg (260 lb)   SpO2 96%   BMI 36.26 kg/m    Exam shows redness but no abscess of the upper left gums.   We gave Vicodin and Decadron.   Home with Rx for clindamycin and a few Vicodin     I have reviewed the nursing notes.    I have reviewed the findings, diagnosis, plan and need for follow up with the patient.  Medical Decision Making  The patient's presentation was of low complexity (an acute and uncomplicated illness or injury).    The patient's evaluation involved:  an assessment requiring an independent historian (see separate area of note for details)    The patient's management necessitated moderate risk (prescription drug management including medications given in the ED).        New Prescriptions    CLINDAMYCIN (CLEOCIN) 150 MG CAPSULE    Take 1 capsule (150 mg) by mouth 3 times daily for 10 days    HYDROCODONE-ACETAMINOPHEN (NORCO) 5-325 MG TABLET    " Take 1 tablet by mouth every 6 hours as needed for severe pain Do not drive or operate machinery for six hours after taking this medicine. This medicine will make you more likely to fall so please be careful.  Do not use firearms for six hours after taking this medicine.       Final diagnoses:   Dental infection       3/30/2024   [facility]  03/31/24 0033

## 2024-03-31 NOTE — DISCHARGE INSTRUCTIONS
Regarding the Vicodin pain medicine:    Do not drive or operate machinery for six hours after taking this medicine. This medicine will make you more likely to fall so please be careful.  Do not use firearms for six hours after taking this medicine.     Thank you for choosing our Emergency Department for your care.     You may receive a phone call or letter for a survey about your care in our ED.  Please complete this as this is how we improve care for our patients.     If you have any questions after leaving the ED you can call or text me on my cell phone at [phone].  This does not mean that I am on call, but I will get back to you.  If you are not doing well please return to the ED.     Sincerely,    Dr Levy Butler M.D.

## 2024-04-06 ENCOUNTER — HOSPITAL ENCOUNTER (EMERGENCY)
Facility: OTHER | Age: 39
Discharge: HOME | End: 2024-04-06
Payer: COMMERCIAL

## 2024-04-06 VITALS
HEIGHT: 71 IN | WEIGHT: 260 LBS | OXYGEN SATURATION: 97 % | HEART RATE: 88 BPM | DIASTOLIC BLOOD PRESSURE: 99 MMHG | TEMPERATURE: 98 F | SYSTOLIC BLOOD PRESSURE: 150 MMHG | RESPIRATION RATE: 20 BRPM | BODY MASS INDEX: 36.4 KG/M2

## 2024-04-06 DIAGNOSIS — K08.89 PAIN, DENTAL: ICD-10-CM

## 2024-04-06 PROCEDURE — 250N000009 HC RX 250

## 2024-04-06 PROCEDURE — 99283 EMERGENCY DEPT VISIT LOW MDM: CPT

## 2024-04-06 PROCEDURE — 99284 EMERGENCY DEPT VISIT MOD MDM: CPT

## 2024-04-06 RX ORDER — BUPIVACAINE HYDROCHLORIDE AND EPINEPHRINE 5; 5 MG/ML; UG/ML
1.8 INJECTION, SOLUTION PERINEURAL ONCE
Status: COMPLETED | OUTPATIENT
Start: 2024-04-06 | End: 2024-04-06

## 2024-04-06 RX ORDER — DEXAMETHASONE SODIUM PHOSPHATE 4 MG/ML
10 VIAL (ML) INJECTION ONCE
Status: COMPLETED | OUTPATIENT
Start: 2024-04-06 | End: 2024-04-06

## 2024-04-06 RX ADMIN — DEXAMETHASONE SODIUM PHOSPHATE 10 MG: 4 INJECTION, SOLUTION INTRAMUSCULAR; INTRAVENOUS at 15:57

## 2024-04-06 ASSESSMENT — COLUMBIA-SUICIDE SEVERITY RATING SCALE - C-SSRS
2. HAVE YOU ACTUALLY HAD ANY THOUGHTS OF KILLING YOURSELF IN THE PAST MONTH?: NO
1. IN THE PAST MONTH, HAVE YOU WISHED YOU WERE DEAD OR WISHED YOU COULD GO TO SLEEP AND NOT WAKE UP?: NO
6. HAVE YOU EVER DONE ANYTHING, STARTED TO DO ANYTHING, OR PREPARED TO DO ANYTHING TO END YOUR LIFE?: NO

## 2024-04-06 ASSESSMENT — ENCOUNTER SYMPTOMS
FEVER: 0
SINUS PAIN: 0
FACIAL SWELLING: 0

## 2024-04-06 NOTE — DISCHARGE INSTRUCTIONS
milly [place] meeting you. Sorry you continue to have dental pain.  Keep appointment with dentist on Monday.   Tylenol 4000 mg daily max. You do have tylenol in your norco (325 mg) so watch this when you are taking your medications.   Continue taking clindamycin as prescribed  Return to be seen:    You have signs of infection, such as:  Increased pain, swelling, warmth, or redness.  Red streaks leading from the area.  Pus draining from the area.  A fever.   Watch closely for changes in your health, and be sure to contact your doctor if:    You do not get better as expected.

## 2024-04-06 NOTE — ED PROVIDER NOTES
History     Chief Complaint   Patient presents with    Dental Problem    Hypertension     The history is provided by the patient, the spouse and medical records.     Ge Concepcion is a 38 year old male who presents to the emergency department with left upper tooth pain. Patient reports he has been dealing with this for about 6 weeks and has been seen twice for it, once in the urgent care and last week in the ER. He was scheduled to see a dentist on Friday (yesterday) in [place] and when he had gotten there the dentist had called in sick. He rescheduled for this coming Monday. Today he reports he had a return in pain, had little relief after taking hydrocodone about 3 hours ago. This was the first time he had taken the hydocodone since getting the RX 1 week ago. He has been taking tylenol and ibuprofen. He has been taking his clindamycin. He denies fevers, chills, facial swelling. He is requesting more steroids today, as this helped his pain the most and the longest.   Past medical history reviewed as noted below      Allergies:  Allergies   Allergen Reactions    Erythromycin Nausea and Vomiting     Other reaction(s): Vomiting    Other Environmental Allergy      Other reaction(s): Chest Tightness, Eye Irritation, Headache, Rhinitis  Dogs, cats, grasses, pollens    Penicillins Other (See Comments) and Nausea and Vomiting       Problem List:    There are no problems to display for this patient.       Past Medical History:    Past Medical History:   Diagnosis Date    Thyrotoxicosis        Past Surgical History:    Past Surgical History:   Procedure Laterality Date    TONSILLECTOMY      No Comments Provided       Family History:    History reviewed. No pertinent family history.    Social History:  Marital Status:  Single [1]  Social History     Tobacco Use    Smoking status: Former     Years: 12     Types: Cigarettes    Smokeless tobacco: Never   Vaping Use    Vaping Use: Never used   Substance Use Topics    Alcohol  "use: Yes     Comment: 375 clear spirits a day    Drug use: Not Currently     Comment: THC edibles occassionally        Medications:    clindamycin (CLEOCIN) 150 MG capsule  famotidine (PEPCID) 40 MG tablet  fluticasone (FLONASE) 50 MCG/ACT nasal spray  HYDROcodone-acetaminophen (NORCO) 5-325 MG tablet  methimazole (TAPAZOLE) 10 MG tablet  methocarbamol (ROBAXIN) 750 MG tablet  metoprolol succinate ER (TOPROL-XL) 25 MG 24 hr tablet  montelukast (SINGULAIR) 10 MG tablet  naproxen (NAPROSYN) 500 MG tablet  omeprazole (PRILOSEC) 40 MG DR capsule  predniSONE (DELTASONE) 20 MG tablet  sildenafil (REVATIO) 20 MG tablet  traMADol (ULTRAM) 50 MG tablet          Review of Systems   Constitutional:  Negative for fever.   HENT:  Positive for dental problem. Negative for facial swelling and sinus pain.    All other systems reviewed and are negative.      Physical Exam   BP: (!) 195/128  Pulse: 89  Temp: 97.8  F (36.6  C)  Resp: 20  Height: 180.3 cm (5' 11\")  Weight: 117.9 kg (260 lb)  SpO2: 97 %      Physical Exam  Vitals and nursing note reviewed.   Constitutional:       General: He is not in acute distress.     Appearance: He is not ill-appearing or toxic-appearing.   HENT:      Head:      Jaw: No tenderness or swelling.      Salivary Glands: Right salivary gland is not diffusely enlarged or tender. Left salivary gland is not diffusely enlarged or tender.      Comments: Mild erythema over left upper canine     Mouth/Throat:      Lips: Pink. No lesions.      Mouth: Mucous membranes are moist.      Dentition: Dental tenderness present. No gingival swelling, dental abscesses or gum lesions.      Palate: No mass and lesions.      Pharynx: Oropharynx is clear. Uvula midline. No oropharyngeal exudate or posterior oropharyngeal erythema.     Cardiovascular:      Rate and Rhythm: Normal rate.   Pulmonary:      Effort: Pulmonary effort is normal.   Musculoskeletal:      Cervical back: Neck supple.   Lymphadenopathy:      Cervical: No " cervical adenopathy.   Neurological:      Mental Status: He is alert.   Psychiatric:         Behavior: Behavior is cooperative.         ED Course          No results found for this or any previous visit (from the past 24 hour(s)).    Medications   BUPivacaine 0.5 % EPINEPHrine 1:200,000 dental injection SOLN 1.8 mL (1.8 mLs Dental Not Given 4/6/24 1557)   dexAMETHasone (DECADRON) injectable solution used ORALLY 10 mg (10 mg Oral $Given 4/6/24 1557)       Assessments & Plan (with Medical Decision Making)  Temp: 98  F (36.7  C) Temp src: Tympanic BP: (!) 150/99 Pulse: 88   Resp: 20 SpO2: 97 % O2 Device: None (Room air)   vitals stable. Afebrile. No fevers at home.  Physical exam: Patient is alert, orientated. He is not ill appearing.  Erythema and dental caries, poor dentition to left upper jaw. No abscess or facial swelling. No chills, fevers at home.   Discussed options for pain control with patient today including a dental block. He kindly declined. He is requesting steroids, and given that he just has an evaluation Monday at the dentist and no scheduled surgery I think this is reasonable. He is on antibiotics. He is not requesting any more narcotic pain medication today. He discussed further investigation for dental pain, as he has been seen three times in the last month, such as a CT scan, and with shared decision making with patient and SO, he politely declined telling me he has imagining on Monday with his dentist- and without increased swelling, changes in pain, fever, I think this is reasonable. He agrees to return to be seen if symptoms worsen.  Meds: decadron  Plan:   dental pain. No new concerns today. Continue with antibiotics and other prescribed medications. Follow up with dentist as scheduled on Monday. Return to be seen if symptoms worsen. Patient to discharge home in stable condition, verbal understanding of discharge instructions given.      I have reviewed the nursing notes.    I have reviewed the  findings, diagnosis, plan and need for follow up with the patient.    Medical Decision Making  The patient's presentation was of straightforward complexity (a clearly self-limited or minor problem).    The patient's evaluation involved:  history and exam without other MDM data elements    The patient's management necessitated moderate risk (prescription drug management including medications given in the ED).      Discharge Medication List as of 4/6/2024  3:58 PM          Final diagnoses:   Pain, dental       4/6/2024   [facility], SERA CNP  04/06/24 3079

## 2024-04-06 NOTE — ED TRIAGE NOTES
"ED Nursing Triage Note (General)   ________________________________    Ge SUJATHA Concepcion is a 38 year old Male that presents to triage via private vehicle with complaints of tooth pain.  Patient states he set up an apt over a month ago for the [facility].  Patient states he was supposed to have his apt this past Friday, however, states when he got to [facility] he was told the provider called in sick.  Patient states his apt got rescheduled for next week.  Per significant other patient was previously seen by Dr. Butler last week for similar complaints of states steroids helped, however, states he took hydrocodone 3 hours ago and is still having pain. Patient is hypertensive on arrival, BP taken twice >5mins apart and systolic remains >185.   Significant symptoms had onset 1 week(s) ago.  BP (!) 195/128   Pulse 89   Temp 97.8  F (36.6  C) (Tympanic)   Resp 20   Ht 1.803 m (5' 11\")   Wt 117.9 kg (260 lb)   SpO2 97%   BMI 36.26 kg/m     PRE HOSPITAL PRIOR LIVING SITUATION Spouse      Triage Assessment (Adult)       Row Name 04/06/24 1508          Triage Assessment    Airway WDL WDL        Respiratory WDL    Respiratory WDL WDL        Skin Circulation/Temperature WDL    Skin Circulation/Temperature WDL WDL        Cardiac WDL    Cardiac WDL WDL     Cardiac Rhythm NSR        Peripheral/Neurovascular WDL    Peripheral Neurovascular WDL WDL        Cognitive/Neuro/Behavioral WDL    Cognitive/Neuro/Behavioral WDL WDL                     "

## 2025-05-17 ENCOUNTER — HEALTH MAINTENANCE LETTER (OUTPATIENT)
Age: 40
End: 2025-05-17

## (undated) RX ORDER — DEXAMETHASONE SODIUM PHOSPHATE 4 MG/ML
INJECTION, SOLUTION INTRA-ARTICULAR; INTRALESIONAL; INTRAMUSCULAR; INTRAVENOUS; SOFT TISSUE
Status: DISPENSED
Start: 2024-04-06

## (undated) RX ORDER — KETOROLAC TROMETHAMINE 30 MG/ML
INJECTION, SOLUTION INTRAMUSCULAR; INTRAVENOUS
Status: DISPENSED
Start: 2023-03-20

## (undated) RX ORDER — DEXAMETHASONE SODIUM PHOSPHATE 10 MG/ML
INJECTION, SOLUTION INTRAMUSCULAR; INTRAVENOUS
Status: DISPENSED
Start: 2024-04-06

## (undated) RX ORDER — HYDROCODONE BITARTRATE AND ACETAMINOPHEN 5; 325 MG/1; MG/1
TABLET ORAL
Status: DISPENSED
Start: 2024-03-30

## (undated) RX ORDER — LORAZEPAM 2 MG/ML
INJECTION INTRAMUSCULAR
Status: DISPENSED
Start: 2023-05-04

## (undated) RX ORDER — BUPIVACAINE HYDROCHLORIDE AND EPINEPHRINE 5; 5 MG/ML; UG/ML
INJECTION, SOLUTION PERINEURAL
Status: DISPENSED
Start: 2024-04-06

## (undated) RX ORDER — PREDNISONE 20 MG/1
TABLET ORAL
Status: DISPENSED
Start: 2023-05-04

## (undated) RX ORDER — DEXAMETHASONE SODIUM PHOSPHATE 4 MG/ML
INJECTION, SOLUTION INTRA-ARTICULAR; INTRALESIONAL; INTRAMUSCULAR; INTRAVENOUS; SOFT TISSUE
Status: DISPENSED
Start: 2024-03-30

## (undated) RX ORDER — OXYCODONE AND ACETAMINOPHEN 5; 325 MG/1; MG/1
TABLET ORAL
Status: DISPENSED
Start: 2023-05-04

## (undated) RX ORDER — KETOROLAC TROMETHAMINE 15 MG/ML
INJECTION, SOLUTION INTRAMUSCULAR; INTRAVENOUS
Status: DISPENSED
Start: 2023-05-04